# Patient Record
Sex: FEMALE | Race: WHITE | NOT HISPANIC OR LATINO
[De-identification: names, ages, dates, MRNs, and addresses within clinical notes are randomized per-mention and may not be internally consistent; named-entity substitution may affect disease eponyms.]

---

## 2018-01-07 ENCOUNTER — TRANSCRIPTION ENCOUNTER (OUTPATIENT)
Age: 83
End: 2018-01-07

## 2023-08-12 ENCOUNTER — APPOINTMENT (OUTPATIENT)
Dept: ORTHOPEDIC SURGERY | Facility: CLINIC | Age: 88
End: 2023-08-12
Payer: MEDICARE

## 2023-08-12 VITALS — BODY MASS INDEX: 27.62 KG/M2 | HEIGHT: 59 IN | WEIGHT: 137 LBS

## 2023-08-12 DIAGNOSIS — I10 ESSENTIAL (PRIMARY) HYPERTENSION: ICD-10-CM

## 2023-08-12 DIAGNOSIS — S49.92XA UNSPECIFIED INJURY OF LEFT SHOULDER AND UPPER ARM, INITIAL ENCOUNTER: ICD-10-CM

## 2023-08-12 DIAGNOSIS — E78.00 PURE HYPERCHOLESTEROLEMIA, UNSPECIFIED: ICD-10-CM

## 2023-08-12 DIAGNOSIS — I51.9 HEART DISEASE, UNSPECIFIED: ICD-10-CM

## 2023-08-12 PROBLEM — Z00.00 ENCOUNTER FOR PREVENTIVE HEALTH EXAMINATION: Status: ACTIVE | Noted: 2023-08-12

## 2023-08-12 PROCEDURE — 99203 OFFICE O/P NEW LOW 30 MIN: CPT

## 2023-08-12 PROCEDURE — A4565: CPT | Mod: LT

## 2023-08-12 PROCEDURE — 73080 X-RAY EXAM OF ELBOW: CPT | Mod: LT

## 2023-08-12 PROCEDURE — 73030 X-RAY EXAM OF SHOULDER: CPT | Mod: LT

## 2023-08-12 NOTE — IMAGING
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 No significant abnormalities are seen] : There are no fractures, subluxations or dislocations. No significant abnormalities are seen [Degenerative change] : Degenerative change [FreeTextEntry1] : ? step off proximal humerus - neck - inferior spur

## 2023-08-12 NOTE — PHYSICAL EXAM
[Sitting] : sitting [Left] : left elbow [de-identified] : no tenderness to palpation [FreeTextEntry9] : no pain with active flexion/extension, passive pronation, supination [] : palpable radial pulse

## 2023-08-12 NOTE — HISTORY OF PRESENT ILLNESS
[10] : 10 [Dull/Aching] : dull/aching [Localized] : localized [Sharp] : sharp [Constant] : constant [Retired] : Work status: retired [de-identified] : 8/12/23:  89Y RHD F presents with her daughter for upper arm pain - L shoulder since she had unwitnessed fall at home this morning. She did not want to move her arm, she has been limiting her movement. No c/o numbness or tingling. Prior hx L shoulder injury 6 years ago from another fall.  No recent issues.  [] : Post Surgical Visit: no [FreeTextEntry1] : R shoulder/elbow [FreeTextEntry3] : 8/12/23 [FreeTextEntry5] : Patient fell down on her shoulder at home this morning 8/12/23, has limited ROM, unable to straighten her arm [de-identified] : movement

## 2023-08-12 NOTE — ASSESSMENT
[FreeTextEntry1] : 89F with L shoulder pain s/p fall today clinical exam for L contusion, GH OA, L Elbow DJD, here with her daughter.  XR today - GH DJD, RC arthropathy, possible proximal humerus fx, unclear if step off on xray is new fx - large inferior spur. She has tenderness in area of question.    Sling for comfort, may move elbow up and down, gentle pendulum LUE to prevent stiffness.  Ice and Tylenol prn pain.  Will bring med list for review.  May need PT for gait and balance training. Will be difficult for her to use walker.   Return with Dr. Hubbard this week for re-eval and consult.

## 2023-08-15 ENCOUNTER — APPOINTMENT (OUTPATIENT)
Dept: MRI IMAGING | Facility: CLINIC | Age: 88
End: 2023-08-15
Payer: MEDICARE

## 2023-08-15 ENCOUNTER — APPOINTMENT (OUTPATIENT)
Dept: ORTHOPEDIC SURGERY | Facility: CLINIC | Age: 88
End: 2023-08-15
Payer: MEDICARE

## 2023-08-15 VITALS — HEIGHT: 59 IN | BODY MASS INDEX: 27.62 KG/M2 | WEIGHT: 137 LBS

## 2023-08-15 DIAGNOSIS — S40.012D CONTUSION OF LEFT SHOULDER, SUBSEQUENT ENCOUNTER: ICD-10-CM

## 2023-08-15 PROCEDURE — 99214 OFFICE O/P EST MOD 30 MIN: CPT

## 2023-08-15 PROCEDURE — 73221 MRI JOINT UPR EXTREM W/O DYE: CPT | Mod: LT

## 2023-08-15 NOTE — HISTORY OF PRESENT ILLNESS
[10] : 10 [Dull/Aching] : dull/aching [Localized] : localized [Sharp] : sharp [Constant] : constant [Retired] : Work status: retired [de-identified] : 8/15/23: 89 y/o RHD female with left shoulder pain since 8/12/23. She reports falling at home while walking into her bedroom. She tried to grab her walker but it tipped over. She was seen at [facility] at that time for xrays. There is pain anterior and into her upper arm. There is some bruising anterior arm. She has been icing and taking tylenol.  PMHx: HTN, HLD [] : Post Surgical Visit: no [FreeTextEntry1] : R shoulder/elbow [FreeTextEntry3] : 8/12/23 [FreeTextEntry5] : Patient fell down on her shoulder at home this morning 8/12/23, has limited ROM, unable to straighten her arm [de-identified] : movement

## 2023-08-15 NOTE — ASSESSMENT
[FreeTextEntry1] : L ELVIRA DJD, possible prox humerus fx. Recommend STAT MRI to eval fx. Continue sling, NWB. Ice, rest. Tylenol prn. RTO after MRI.

## 2023-08-17 ENCOUNTER — APPOINTMENT (OUTPATIENT)
Dept: ORTHOPEDIC SURGERY | Facility: CLINIC | Age: 88
End: 2023-08-17
Payer: MEDICARE

## 2023-08-17 VITALS — BODY MASS INDEX: 27.62 KG/M2 | HEIGHT: 59 IN | WEIGHT: 137 LBS

## 2023-08-17 DIAGNOSIS — S42.201D UNSPECIFIED FRACTURE OF UPPER END OF RIGHT HUMERUS, SUBSEQUENT ENCOUNTER FOR FRACTURE WITH ROUTINE HEALING: ICD-10-CM

## 2023-08-17 DIAGNOSIS — M75.122 COMPLETE ROTATOR CUFF TEAR OR RUPTURE OF LEFT SHOULDER, NOT SPECIFIED AS TRAUMATIC: ICD-10-CM

## 2023-08-17 DIAGNOSIS — Z78.9 OTHER SPECIFIED HEALTH STATUS: ICD-10-CM

## 2023-08-17 DIAGNOSIS — M19.012 PRIMARY OSTEOARTHRITIS, LEFT SHOULDER: ICD-10-CM

## 2023-08-17 PROCEDURE — 99214 OFFICE O/P EST MOD 30 MIN: CPT

## 2023-08-17 NOTE — ASSESSMENT
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 Radiologist recommended whole body bone scan to further eval. She will consult with PMD today at scheduled appointment. She will also bring the report and discuss this with her PCP.  Continue sling, NWB. Ice, rest. Tylenol prn. RTO 2 weeks to repeat xrays

## 2023-08-17 NOTE — DATA REVIEWED
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

## 2023-08-17 NOTE — HISTORY OF PRESENT ILLNESS
[10] : 10 [Dull/Aching] : dull/aching [Localized] : localized [Sharp] : sharp [Constant] : constant [Retired] : Work status: retired [de-identified] : 8/17/23: Here to review MRI.  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  8/15/23: 89 y/o RHD female with left shoulder pain since 8/12/23. She reports falling at home while walking into her bedroom. She tried to grab her walker but it tipped over. She was seen at [facility] at that time for xrays. There is pain anterior and into her upper arm. There is some bruising anterior arm. She has been icing and taking tylenol.  PMHx: HTN, HLD [] : Post Surgical Visit: no [FreeTextEntry1] : R shoulder/elbow [FreeTextEntry3] : 8/12/23 [FreeTextEntry5] : Patient fell down on her shoulder at home this morning 8/12/23, has limited ROM, unable to straighten her arm [de-identified] : movement

## 2023-08-17 NOTE — REVIEW OF SYSTEMS
[Arthralgia] : arthralgia [Joint Pain] : joint pain [Negative] : Heme/Lymph [FreeTextEntry9] : left shoulder

## 2023-09-19 ENCOUNTER — APPOINTMENT (OUTPATIENT)
Dept: ORTHOPEDIC SURGERY | Facility: CLINIC | Age: 88
End: 2023-09-19